# Patient Record
Sex: FEMALE | Race: WHITE | URBAN - METROPOLITAN AREA
[De-identification: names, ages, dates, MRNs, and addresses within clinical notes are randomized per-mention and may not be internally consistent; named-entity substitution may affect disease eponyms.]

---

## 2022-02-01 ENCOUNTER — TELEPHONE (OUTPATIENT)
Dept: FAMILY MEDICINE CLINIC | Age: 37
End: 2022-02-01

## 2022-02-01 NOTE — TELEPHONE ENCOUNTER
Called pt    Stated that she will call if she can not make it but would like to keep appt for now on Friday.

## 2022-02-22 ENCOUNTER — OFFICE VISIT (OUTPATIENT)
Dept: FAMILY MEDICINE CLINIC | Age: 37
End: 2022-02-22
Payer: COMMERCIAL

## 2022-02-22 VITALS
DIASTOLIC BLOOD PRESSURE: 70 MMHG | OXYGEN SATURATION: 98 % | HEART RATE: 73 BPM | HEIGHT: 63 IN | SYSTOLIC BLOOD PRESSURE: 110 MMHG | WEIGHT: 130 LBS | BODY MASS INDEX: 23.04 KG/M2

## 2022-02-22 DIAGNOSIS — R93.89 ABNORMAL FINDING PRESENT ON DIAGNOSTIC IMAGING OF UTERUS: ICD-10-CM

## 2022-02-22 DIAGNOSIS — N96 HISTORY OF RECURRENT MISCARRIAGES: Primary | ICD-10-CM

## 2022-02-22 DIAGNOSIS — N92.0 MENORRHAGIA WITH REGULAR CYCLE: ICD-10-CM

## 2022-02-22 DIAGNOSIS — L68.0 HIRSUTISM: ICD-10-CM

## 2022-02-22 PROCEDURE — 99203 OFFICE O/P NEW LOW 30 MIN: CPT | Performed by: FAMILY MEDICINE

## 2022-02-22 ASSESSMENT — PATIENT HEALTH QUESTIONNAIRE - PHQ9
SUM OF ALL RESPONSES TO PHQ QUESTIONS 1-9: 0
SUM OF ALL RESPONSES TO PHQ QUESTIONS 1-9: 0
SUM OF ALL RESPONSES TO PHQ9 QUESTIONS 1 & 2: 0
2. FEELING DOWN, DEPRESSED OR HOPELESS: 0
1. LITTLE INTEREST OR PLEASURE IN DOING THINGS: 0
SUM OF ALL RESPONSES TO PHQ QUESTIONS 1-9: 0
SUM OF ALL RESPONSES TO PHQ QUESTIONS 1-9: 0

## 2022-02-22 NOTE — PATIENT INSTRUCTIONS
Contact Liset Stewart to begin charting your cycles at [phone].      Let me know when you are ready to begin your 'hormone profile'  (progesterone and estradiol levels peak plus [de-identified] and 11)

## 2022-02-22 NOTE — PROGRESS NOTES
2022    Blood pressure 110/70, pulse 73, height 5' 2.5\" (1.588 m), weight 130 lb (59 kg), last menstrual period 2022, SpO2 98 %, not currently breastfeeding. Ruth Edwards (:  1985) is a 29 y.o. female, here for evaluation of the following medical concerns:    Chief Complaint   Patient presents with    New Patient     fertility check     From [facility] originally, now living in [facility]).  from [place], [address], age 40, no health problems, no rx used   SAB first TM (after 2nd child, 2016). Here today for concerns that she has had delayed conception for past year, despite not avoiding. Has not been charting, not using fertility focused intercourse, but has never had delayed conception before. She has noted heavier menses since ~2020, after she weaned her youngest baby. Currently about 9 days of total menstruation. No wt loss, but has noted weight gain and unable to get back to her pre-pregnancy weight. She also has worry that she may have had very early miscarriages a couple times in the past year due to a couple delayed menstruations (did not test HCG). This past December she had a 'weird feeling' like she has throughout the year and did happen to test her urine HCG and it was pos twice, but menses happened at a normal time interval.    occ has deep dyspareunia (but only a few times)  Denies PMS (maybe more emotional)  Has noted more acne in past year. Saw OB : TVUS abnormal for intrauterine mass? Recommended D&C or saline HSG. Dr Sara Hernandez with [address]    TSH tested : 0.88 (normal prolactin, thyroid antibodies, vit D, DHEA, cortisol, acth,   Pap normal 3/21. No prior eval for fertility other than mentioned above. There is no problem list on file for this patient. Body mass index is 23.4 kg/m².     Wt Readings from Last 3 Encounters:   22 130 lb (59 kg)       BP Readings from Last 3 Encounters:   22 110/70       No Known Allergies    Prior to Visit Medications    Not on File        Social History     Tobacco Use    Smoking status: Never Smoker    Smokeless tobacco: Never Used   Substance Use Topics    Alcohol use: Never    Drug use: Never       Review of Systems       Physical Exam  Vitals and nursing note reviewed. Constitutional:       Appearance: She is well-developed. Cardiovascular:      Rate and Rhythm: Normal rate and regular rhythm. Heart sounds: Normal heart sounds. No murmur heard. No friction rub. Pulmonary:      Effort: Pulmonary effort is normal. No respiratory distress. Breath sounds: Normal breath sounds. No wheezing or rales. Musculoskeletal:         General: Normal range of motion. Skin:     General: Skin is warm and dry. Neurological:      Mental Status: She is alert and oriented to person, place, and time. Deep Tendon Reflexes: Reflexes are normal and symmetric. Psychiatric:         Behavior: Behavior normal.         Thought Content: Thought content normal.         Judgment: Judgment normal.         ASSESSMENT/PLAN:    1. History of recurrent miscarriages  - x2, early first trimester losses  - discussed [employer] and the usual evaluation for causes of miscarriage. Recommend avoidance of pregnancy until further evaluation. Start charting cycles and plan luteal progesterone and estradiol profile. Also, should do thrombotic risk profile. 2. Menorrhagia with regular cycle  - recommend charting cycles to learn more about her bleeding patterns; this often helps reveal the cause and guide further evaluation and treatment    3.  Abnormal finding present on diagnostic imaging of uterus  - discussed options for further evaluation and referred her back to her OB for further discussion and eval.    4. Hirsutism- (mild acne, hair, crown thinning)  - discussed the 2003 [place] criteria for pcos, including the sonographic criteria; does not meet criteria presently, but repeating a pelvic sono may help in diagnosis (her prior sono was done in ob's office and unavailable for review). will wait on this for now. Total time spent on this encounter: 40 min    Return in about 3 months (around 5/22/2022) for after hormone profile is comleted. .    An  electronicsignature was used to authenticate this note.     --Jovi Rabago MD on 2/22/2022 at 2:12 PM

## 2022-05-22 LAB
ESTRADIOL, SENSITIVE: 174 PG/ML
PROGESTERONE LEVEL: 14.9 NG/ML

## 2022-05-24 LAB
ESTRADIOL, SENSITIVE: 223 PG/ML
PROGESTERONE LEVEL: 20.2 NG/ML

## 2022-05-25 ENCOUNTER — TELEPHONE (OUTPATIENT)
Dept: FAMILY MEDICINE CLINIC | Age: 37
End: 2022-05-25

## 2022-05-25 NOTE — TELEPHONE ENCOUNTER
Patient called regarding her labs that she is getting done. She said she spoke to Nitesh Drake and wanted to let Nitesh Drake know that if anything is abnormal with her labs to please call her before her [facility] appointment.     Please Advise

## 2022-05-26 LAB
ESTRADIOL, SENSITIVE: 277 PG/ML
PROGESTERONE LEVEL: 30.3 NG/ML

## 2022-05-28 LAB
ESTRADIOL, SENSITIVE: 274 PG/ML
PROGESTERONE LEVEL: 19.5 NG/ML

## 2022-06-08 PROBLEM — D68.52 PROTHROMBIN GENE MUTATION (HCC): Status: ACTIVE | Noted: 2022-06-08

## 2022-06-10 ENCOUNTER — OFFICE VISIT (OUTPATIENT)
Dept: FAMILY MEDICINE CLINIC | Age: 37
End: 2022-06-10
Payer: COMMERCIAL

## 2022-06-10 VITALS
HEIGHT: 63 IN | BODY MASS INDEX: 23.04 KG/M2 | HEART RATE: 67 BPM | DIASTOLIC BLOOD PRESSURE: 62 MMHG | OXYGEN SATURATION: 99 % | SYSTOLIC BLOOD PRESSURE: 100 MMHG | WEIGHT: 130 LBS

## 2022-06-10 DIAGNOSIS — D68.52 PROTHROMBIN GENE MUTATION (HCC): ICD-10-CM

## 2022-06-10 DIAGNOSIS — N96 HISTORY OF RECURRENT MISCARRIAGES: ICD-10-CM

## 2022-06-10 DIAGNOSIS — L68.0 HIRSUTISM: Primary | ICD-10-CM

## 2022-06-10 PROCEDURE — 99214 OFFICE O/P EST MOD 30 MIN: CPT | Performed by: FAMILY MEDICINE

## 2022-06-10 NOTE — PROGRESS NOTES
6/10/2022    Blood pressure 100/62, pulse 67, height 5' 2.5\" (1.588 m), weight 130 lb (59 kg), last menstrual period 2022, SpO2 99 %, not currently breastfeeding. Frida Duong (:  1985) is a 29 y.o. female, here for evaluation of the following medical concerns:    Chief Complaint   Patient presents with    Follow-up     go over test results     Fu after initial visit , hx of SAB x2, 4 living children ( SAB2)  Has hx prothrombin mutation, heterozygous/  Has luteal profile done last month- normal progesterone and estradiol level throughout the luteal phase. Hx hirsutism without other features of PCOS (TVUS reportedly normal, DHEA normal  TSH normal  (0.88) with neg thyroid antibodies. (st e's)    She charts [place]. With [employer]. Can confidently identify peak day. Cycles at least monthly. Forgot charts. She is not avoiding pregnancy. She takes a PNV. Patient Active Problem List   Diagnosis    History of recurrent miscarriages (x2, early first TM,  and )    Abnormal finding present on diagnostic imaging of uterus    Menorrhagia with regular cycle    Hirsutism- (mild acne, hair, crown thinning)    Prothrombin gene mutation ([facility] [address]        Body mass index is 23.4 kg/m². Wt Readings from Last 3 Encounters:   06/10/22 130 lb (59 kg)   22 130 lb (59 kg)       BP Readings from Last 3 Encounters:   06/10/22 100/62   22 110/70       No Known Allergies    Prior to Visit Medications    Not on File        Social History     Tobacco Use    Smoking status: Never Smoker    Smokeless tobacco: Never Used   Substance Use Topics    Alcohol use: Never    Drug use: Never       Review of Systems As above     Physical Exam  Vitals and nursing note reviewed. Constitutional:       General: She is not in acute distress. Appearance: Normal appearance. She is well-developed. She is not ill-appearing. Neck:      Thyroid: No thyromegaly.    Cardiovascular: Rate and Rhythm: Normal rate and regular rhythm. Pulses: Normal pulses. Heart sounds: Normal heart sounds. No murmur heard. Pulmonary:      Effort: Pulmonary effort is normal. No respiratory distress. Breath sounds: Normal breath sounds. No wheezing or rales. Musculoskeletal:         General: Normal range of motion. Cervical back: Normal range of motion. Skin:     General: Skin is warm and dry. Neurological:      General: No focal deficit present. Mental Status: She is alert and oriented to person, place, and time. Mental status is at baseline. Psychiatric:         Mood and Affect: Mood normal.         Behavior: Behavior normal.         Thought Content: Thought content normal.         Judgment: Judgment normal.         ASSESSMENT/PLAN:    1. Hirsutism- (mild acne, hair, crown thinning)  - since her main goal is for for conception, further eval for PCOS is warranted. Start with sono. Continue to chart cycles- it is not 100% clear that her bleeding episodes are menstrual or break-through bleeding.   - US NON OB TRANSVAGINAL; Future    2. Prothrombin gene mutation [facility])  - discussed that it is unclear if antithrombotic therapy is effective in pregnancy for miscarriage prevention for heterozygous women with no other clotting defects or prior thrombotic events. But some studies do show ASA 81 to be safe and perhaps helpful for miscarriage prevention for those with 2 or more SAB's.    3. History of recurrent miscarriages (x2, early first TM, 2016 and 2021)  - As above; start asa 81, assess for PCOS. - luteal hormone profile shows excellent luteal function  With 4 successful pregnancies, chromosomal abnormality would be unlikely. Return in about 3 months (around 9/10/2022). An  [employer] was used to authenticate this note.     --Suzi Carver MD on 6/10/2022 at 3:10 PM

## 2022-08-10 ENCOUNTER — TELEPHONE (OUTPATIENT)
Dept: FAMILY MEDICINE CLINIC | Age: 37
End: 2022-08-10

## 2022-08-10 DIAGNOSIS — L68.0 HIRSUTISM: Primary | ICD-10-CM

## 2022-08-10 NOTE — TELEPHONE ENCOUNTER
----- Message from Adrián Rueda sent at 8/10/2022  8:37 AM EDT -----  Subject: Referral Request    Reason for referral request? pt saw Dr Kenny Meier in June, wasn't able to   get the ultrasound till now and the order is closed, asking that a new one   be sent [facility], please fax to [phone]  Provider patient wants to be referred to(if known):     Provider Phone Number(if known):     Additional Information for Provider?   ---------------------------------------------------------------------------  --------------  [address] [employer] [facility]    [phone]; OK to leave message on voicemail  ---------------------------------------------------------------------------  --------------

## 2022-09-01 ENCOUNTER — TELEPHONE (OUTPATIENT)
Dept: FAMILY MEDICINE CLINIC | Age: 37
End: 2022-09-01

## 2022-09-01 DIAGNOSIS — O09.291 CURRENT PREGNANCY IN FIRST TRIMESTER WITH HISTORY OF SPONTANEOUS ABORTION DURING PRIOR PREGNANCY: Primary | ICD-10-CM

## 2022-09-01 RX ORDER — PROGESTERONE 200 MG/1
CAPSULE ORAL
Qty: 60 CAPSULE | Refills: 3 | Status: SHIPPED | OUTPATIENT
Start: 2022-09-01

## 2022-09-01 RX ORDER — ASPIRIN 81 MG/1
81 TABLET, CHEWABLE ORAL DAILY
Qty: 90 TABLET | Refills: 2 | Status: SHIPPED | OUTPATIENT
Start: 2022-09-01

## 2022-09-01 NOTE — TELEPHONE ENCOUNTER
Start progesterone supplementation AFTER having her progesterone level drawn. Take at bedtime (both capsules together, last thing before going to bed). Does she know her LMP? Thanks.

## 2022-09-01 NOTE — TELEPHONE ENCOUNTER
Called pt and relayed message   Pt does not know date of last LMP   Pt agreed with plan   Pt got labs drawn today   FYI

## 2022-09-01 NOTE — TELEPHONE ENCOUNTER
Patient called in to let Dr. Ciara Stanton know that she got a positive pregnancy test this morning and wanted to know if she should start taking progesterone    Please Advise

## 2022-09-01 NOTE — TELEPHONE ENCOUNTER
Called pt and relayed message   Faxed over labs for pt to have drawn   Pt wants to know when to start the progesterone and should pt take it everyday   Pt said she will  medications and start the ASA 81mg and the prenatel   Please advise

## 2022-09-01 NOTE — TELEPHONE ENCOUNTER
Luke! Due to prior miscarriage, I would advise to have progesterone level tested, follow q 2 wks and start Progesterone 400mg intravaginally plus ASA 81 mg and her usual prenatal vitamin. I sent prescriptions for asa and progesterone to her [facility]. Does she know the date of her LMP? Thanks.

## 2022-09-02 LAB — PROGESTERONE LEVEL: 29.1 NG/ML

## 2022-09-23 LAB — PROGESTERONE LEVEL: 27.8 NG/ML

## 2022-09-23 NOTE — RESULT ENCOUNTER NOTE
Please notify Robina Cook that that her 6 wk progesterone level is very good at 27.8. You may continue your current dose of progesterone and retest in 2 wks. Hope all is well! Dr Michael Chamberlain.

## 2022-11-04 ENCOUNTER — TELEPHONE (OUTPATIENT)
Dept: FAMILY MEDICINE CLINIC | Age: 37
End: 2022-11-04

## 2022-11-04 NOTE — TELEPHONE ENCOUNTER
Please notify Jaspallisa Alyx that she should continue her current dose of progesterone until we can review her current progesterone level in pregnancy. Can you ask what her LMP was? Thanks.

## 2022-11-04 NOTE — TELEPHONE ENCOUNTER
Pt had labs done today   Pt wants to know should she continue on the progesterone ?       Please advise

## 2022-11-04 NOTE — TELEPHONE ENCOUNTER
Pt states her LMP was the first week of august not sure what day exactly. She did her blood draw today.

## 2022-11-05 LAB — PROGESTERONE LEVEL: 39.9 NG/ML

## 2022-11-19 LAB — PROGESTERONE LEVEL: 38.9 NG/ML

## 2022-12-06 LAB — PROGESTERONE LEVEL: 30.1 NG/ML

## 2022-12-23 LAB — PROGESTERONE LEVEL: 44.6 NG/ML

## 2023-01-10 LAB — PROGESTERONE LEVEL: 50 NG/ML

## 2023-01-27 LAB — PROGESTERONE LEVEL: 73.8 NG/ML
